# Patient Record
Sex: MALE | Race: WHITE | Employment: FULL TIME | URBAN - METROPOLITAN AREA
[De-identification: names, ages, dates, MRNs, and addresses within clinical notes are randomized per-mention and may not be internally consistent; named-entity substitution may affect disease eponyms.]

---

## 2018-05-31 ENCOUNTER — APPOINTMENT (OUTPATIENT)
Dept: PHYSICAL THERAPY | Age: 43
End: 2018-05-31

## 2018-06-04 ENCOUNTER — HOSPITAL ENCOUNTER (OUTPATIENT)
Dept: PHYSICAL THERAPY | Age: 43
Discharge: HOME | End: 2018-06-04
Payer: MEDICAID

## 2018-06-04 PROCEDURE — 97110 THERAPEUTIC EXERCISES: CPT

## 2018-06-04 PROCEDURE — 97161 PT EVAL LOW COMPLEX 20 MIN: CPT

## 2018-06-04 NOTE — PROGRESS NOTES
[facility]. [facility], [address] - Phone: [phone]  Fax: [phone] / [address]  Patient Name: Buzz Zarate : 1975   Medical   Diagnosis: Elbow pain, left [M25.522] Treatment Diagnosis: Elbow pain, left [M25.522]   Onset Date: 2 years     Referral Source: [place] Wilks* Start of Care [facility]): 2018   Prior Hospitalization: See medical history Provider #: [id number]   Prior Level of Function: FWB L UE without difficulty   Comorbidities: OA   Medications: Verified on Patient Summary List   The Plan of Care and following information is based on the information from the initial evaluation.   ===========================================================================================  Assessment / key information:  Pt is a 37 yo male who presents with (+) X-ray L elbow corticaled osseous fragment adjacent to medial epicondyle, mild OA, and bone spurs. ISIDRO/HPI: Injury 20+ years ago, \"possible dislocation\", wherein Pt has never been able to fully extend L elbow. 2 years ago insidious onset of L elbow swelling, which comes and goes every 2 weeks. Pt reports location of pain at lateral L elbow, nature of pain pressure/achy, current pain level 0/10, pain level at worst 4/10, and pain level at best 0/10. Aggravating factors: laying down on L elbow, push-ups, bench-press, end-range elbow flexion (grooming/uper body dressing), and sustained end-range elbow extension. Relieving factors: advil. PMHx/Falls: R knee scope, L knee scope '. CLoF: weight lifting 5x/week and cardio 2x/week. Objective: R/L elbow flexion-extension AROM: 0-138/- deg. R/L Shoulder ER AROM: C6/C4. L elbow flexion, extension, pronation, supination MMT: 5/5 for all. L shoulder ER MMT: 4/5.  Pt would benefit from skilled PT intervention in order to improve upon previously mentioned subjective/objective impairments. ===========================================================================================  Eval Complexity: History MEDIUM  Complexity : 1-2 comorbidities / personal factors will impact the outcome/ POC ;  Examination  LOW Complexity : 1-2 Standardized tests and measures addressing body structure, function, activity limitation and / or participation in recreation ; Presentation LOW Complexity : Stable, uncomplicated ;  Decision Making MEDIUM Complexity : FOTO score of 26-74; Overall Complexity LOW   Problem List: pain affecting function, decrease ROM, edema affecting function, decrease ADL/ functional abilitiies, decrease activity tolerance and decrease flexibility/ joint mobility   Treatment Plan may include any combination of the following: Therapeutic exercise, Therapeutic activities, Neuromuscular re-education, Physical agent/modality, Manual therapy, Patient education, Self Care training and Functional mobility training  Patient / Family readiness to learn indicated by: asking questions, trying to perform skills and interest  Persons(s) to be included in education: patient (P)  Barriers to Learning/Limitations: None  Measures taken:    Patient Goal (s): \"determine if surgery is necessary\"   Patient self reported health status: good  Rehabilitation Potential: good   Short Term Goals: To be accomplished in  2-3  weeks:  1. Establish initial HEP and pt compliant with instructions  2. Pt will report </=3/10 pain at worst to demonstrate increased tolerance laying on L elbow   Long Term Goals: To be accomplished in  5-6  weeks:  1. Increase FOTO score to 71 indicating improved function  2. Pt will report </=2/10 pain at worst to demonstrate increased tolerance performing  L UE FWB ADL's  3. Pt will increase L elbow extension AROM: </=-5 deg to perform push-up without difficulty  4.  Pt will increase L shoulder ER MMT: >/=4+/5 to perform bench-press without difficulty  Frequency / Duration:   Patient to be seen  2  times per week for 6  weeks:  Patient / Caregiver education and instruction: self care, activity modification and exercises  Therapist Signature: Milton Gonzalez PT Date: 4/0/6250   Certification Period: N/A Time: 4:56 PM   ===========================================================================================  I certify that the above Physical Therapy Services are being furnished while the patient is under my care. I agree with the treatment plan and certify that this therapy is necessary. Physician Signature:        Date:       Time:     Please sign and return to [facility] or you may fax the signed copy to [phone]. Thank you.

## 2018-06-04 NOTE — PROGRESS NOTES
PHYSICAL THERAPY - DAILY TREATMENT NOTE      Patient Name: Amanda Saucedo        Date: 2018  : 1975   YES Patient  Verified  Visit #:     Insurance: Payor: BLUE CROSS MEDICAID / Plan: [place] [employer] [place] / Product Type: Managed Care Medicaid /      In time: 4:00 Out time: 4:45   Total Treatment Time: 45     TREATMENT AREA = Elbow pain, left [M25.522]    SUBJECTIVE    Pain Level (on 0 to 10 scale):  0  / 10   Medication Changes/New allergies or changes in medical history, any new surgeries or procedures? NO    If yes, update Summary List   Subjective Functional Status/Changes:  []  No changes reported     See POC         Physical Therapy Initial Evaluation: Elbow Objective    AROM R L   Flexion   138 142   Extension 0 -10   Pronation WFL WFL   Supination WFL WFL     MMT R L   Flexion 5/5   4/4   Extension 5/5 5/5   Supination 5/5 5/5   Pronation 5/5 5/5     Wrist MMT R L   Flexion   5/5 5/5   Extension 5/5 5/5   UD 5/5 5/5   RD 5/5 5/5     Palpation:   [] Min  [x] Mod  [] Severe    Location: L lateral tricep  [] Min  [x] Mod  [] Severe    Location: L middle tricep    Other:   R/L Shoulder ER AROM: C6/C4  R/L Shoulder ER MMT: 5/5; 4/5    10 min Therapeutic Exercise:  [x]  See flow sheet   Rationale:      increase ROM to improve the patients ability to perform all UE ADL's     Throughout Tx min Patient Education:  YES  Reviewed HEP   []  Progressed/Changed HEP based on:         Other Objective/Functional Measures:    See above     Post Treatment Pain Level (on 0 to 10) scale:   0  / 10     ASSESSMENT    Assessment/Changes in Function:     See POC     []  See Progress Note/Recertification   Patient will continue to benefit from skilled PT services to modify and progress therapeutic interventions, address functional mobility deficits, address ROM deficits, address strength deficits, analyze and address soft tissue restrictions, analyze and cue movement patterns, analyze and modify body mechanics/ergonomics and assess and modify postural abnormalities to attain remaining goals.    Progress toward goals / Updated goals:    See POC     PLAN    [x]  Upgrade activities as tolerated YES Continue plan of care   []  Discharge due to :    []  Other:      Therapist: Chintan Smith PT    Date: 6/4/2018 Time: 4:57 PM   Future Appointments  Date Time Provider Katie Byrne   6/7/2018 8:30 AM Loreatha Nageotte, PT [facility]HAB [facility] AT [facility]   6/12/2018 7:00 AM Chintan Smith PT [facility] AT [facility]   6/14/2018 7:00 AM Chintan Smith PT [facility] AT [facility]   6/19/2018 7:00 AM Chintan Smith, PT [facility] AT [facility]   6/21/2018 8:00 AM Loreatha Nageotte, PT [facility] AT [facility]

## 2018-06-07 ENCOUNTER — HOSPITAL ENCOUNTER (OUTPATIENT)
Dept: PHYSICAL THERAPY | Age: 43
Discharge: HOME | End: 2018-06-07
Payer: MEDICAID

## 2018-06-07 PROCEDURE — 97110 THERAPEUTIC EXERCISES: CPT

## 2018-06-07 PROCEDURE — 97140 MANUAL THERAPY 1/> REGIONS: CPT

## 2018-06-07 NOTE — PROGRESS NOTES
PHYSICAL THERAPY - DAILY TREATMENT NOTE    Patient Name: Dayan Sanchez        Date: 2018  : 1975    Patient  Verified: YES  Visit #:   2   of   12  Insurance: Payor: BLUE CROSS MEDICAID / Plan: [facility] [employer] / Product Type: Managed Care Medicaid /      In time: 8:30 Out time: 9:25   Total Treatment Time: 55     Medicare Time Tracking (below)   Total Timed Codes (min):  45 1:1 Treatment Time:  30     TREATMENT AREA/ DIAGNOSIS = Elbow pain, left [M25.522]    SUBJECTIVE  Pain Level (on 0 to 10 scale):  0  / 10   Medication Changes/New allergies or changes in medical history, any new surgeries or procedures?     NO    If yes, update Summary List   Subjective Functional Status/Changes:  []  No changes reported     Functional improvement no pain   Functional limitation it sells up every other Wednesday      OBJECTIVE  Modalities Rationale:     decrease edema, decrease inflammation and decrease pain to improve patient's ability to perform ADLs without pain   min [] Estim, type/location:                                      []  att     []  unatt     []  w/US     []  w/ice    []  w/heat    min []  Mechanical Traction: type/lbs                   []  pro   []  sup   []  int   []  cont    []  before manual    []  after manual    min []  Ultrasound, settings/location:      min []  Iontophoresis w/ dexamethasone, location:                                               []  take home patch       []  in clinic   10 min [x]  Ice     []  Heat    location/position:     min []  Vasopneumatic Device, press/temp:     min []  Other:    [x] Skin assessment post-treatment (if applicable):    [x]  intact    [x]  redness- no adverse reaction     [x]redness - adverse reaction:        15 min Manual Therapy: Passive L elbow extension, IASTM/DTM to elbow   Rationale:      decrease pain, increase ROM and increase tissue extensibility to improve patient's ability to perform ADLs without pain    30 min Therapeutic Exercise:  [x]  See flow sheet   Rationale:      increase ROM and increase strength to improve the patients ability to perform ADLs without pain     min Patient Education:  Yes    [x] Reviewed HEP   []  Progressed/Changed HEP based on: Other Objective/Functional Measures:    Pt with use of L arm, pushing more than pulling  Initiated strengthening of UE  Tender just posterior to wrist extensor origin   Post Treatment Pain Level (on 0 to 10) scale:   0  / 10     ASSESSMENT  Assessment/Changes in Function:     No pain or swelling today, swells every 2 weeks      []  See Progress Note/Recertification   Patient will continue to benefit from skilled PT services to modify and progress therapeutic interventions, address functional mobility deficits, address ROM deficits, address strength deficits, analyze and address soft tissue restrictions, analyze and cue movement patterns, analyze and modify body mechanics/ergonomics and assess and modify postural abnormalities to attain remaining goals.    Progress toward goals / Updated goals:    NA     PLAN  [x]  Upgrade activities as tolerated YES Continue plan of care   []  Discharge due to :    []  Other:      Therapist: Newton Pickering PT    Date: 6/7/2018 Time: 11:58 AM        Future Appointments  Date Time Provider Katie Byrne   6/12/2018 7:00 AM Thad Alfredo, PT [facility] AT [facility]   6/14/2018 7:00 AM Thad Alfredo, PT [facility] AT [facility]   6/19/2018 7:00 AM Thad Alfredo PT [facility] AT [facility]   6/21/2018 8:00 AM Newton Pickering, PT RE[facility]ENTER AT [facility]

## 2018-06-12 ENCOUNTER — HOSPITAL ENCOUNTER (OUTPATIENT)
Dept: PHYSICAL THERAPY | Age: 43
Discharge: HOME | End: 2018-06-12
Payer: MEDICAID

## 2018-06-12 PROCEDURE — 97110 THERAPEUTIC EXERCISES: CPT

## 2018-06-12 PROCEDURE — 97140 MANUAL THERAPY 1/> REGIONS: CPT

## 2018-06-12 NOTE — PROGRESS NOTES
PHYSICAL THERAPY - DAILY TREATMENT NOTE      Patient Name: Vicente Martin        Date: 2018  : 1975   YES Patient  Verified  Visit #:   3   of   12  Insurance: Payor: BLUE CROSS MEDICAID / Plan: [facility] [employer] HE[employer]KEEPERS PLUS / Product Type: Managed Care Medicaid /      In time: 7:00 Out time: 7:55   Total Treatment Time: 45       TREATMENT AREA = Elbow pain, left [M25.522]    SUBJECTIVE    Pain Level (on 0 to 10 scale):  0  / 10   Medication Changes/New allergies or changes in medical history, any new surgeries or procedures? NO    If yes, update Summary List   Subjective Functional Status/Changes:  []  No changes reported     It's swollen a day earlier than normal       OBJECTIVE    35 min Therapeutic Exercise:  [x]  See flow sheet   Rationale:      increase ROM and increase strength to improve the patients ability to perform all UE ADL's       10 min Manual Therapy:  L lateral tricep IASTM.  Elbow ext stretch   Rationale:      increase ROM and increase tissue extensibility to improve patient's ability to perform all UE ADL's      Modalities Rationale:     decrease pain to improve patient's ability to perform all UE ADL's      min [] Estim, type/location:                                      []  att     []  unatt     []  w/US     []  w/ice    []  w/heat    min []  Mechanical Traction: type/lbs                   []  pro   []  sup   []  int   []  cont    []  before manual    []  after manual    min []  Ultrasound, settings/location:      min []  Iontophoresis w/ dexamethasone, location:                                               []  take home patch       []  in clinic   10 min [x]  Ice     []  Heat    location/position: L elbow/supine    min []  Vasopneumatic Device, press/temp:     min []  Other:    [x] Skin assessment post-treatment (if applicable):    [x]  intact    [x]  redness- no adverse reaction     []redness - adverse reaction:      Throughout Tx min Patient Education:  Melvin Padilal Reviewed HEP   []  Progressed/Changed HEP based on: Other Objective/Functional Measures:    Increased posterior elbow pain with elbow extension stretch     Post Treatment Pain Level (on 0 to 10) scale:   0  / 10     ASSESSMENT    Assessment/Changes in Function:     No increased pain during therex     []  See Progress Note/Recertification   Patient will continue to benefit from skilled PT services to modify and progress therapeutic interventions, address functional mobility deficits, address ROM deficits, address strength deficits, analyze and address soft tissue restrictions, analyze and cue movement patterns, analyze and modify body mechanics/ergonomics and assess and modify postural abnormalities to attain remaining goals.    Progress toward goals / Updated goals:    Continue manual to meet ROM goals     PLAN    [x]  Upgrade activities as tolerated YES Continue plan of care   []  Discharge due to :    []  Other:      Therapist: Renaldo Taylor PT    Date: 6/12/2018 Time: 7:24 AM   Future Appointments  Date Time Provider Katie Byrne   6/14/2018 7:00 AM Renaldo Taylor PT [facility] AT [facility]   6/19/2018 7:00 AM Renaldo Taylor PT [facility] AT [facility]   6/21/2018 8:00 AM Taz Villalobos PT RE[facility]AB CENTER AT [facility]

## 2018-06-14 ENCOUNTER — HOSPITAL ENCOUNTER (OUTPATIENT)
Dept: PHYSICAL THERAPY | Age: 43
Discharge: HOME | End: 2018-06-14
Payer: MEDICAID

## 2018-06-14 PROCEDURE — 97110 THERAPEUTIC EXERCISES: CPT

## 2018-06-14 PROCEDURE — 97140 MANUAL THERAPY 1/> REGIONS: CPT

## 2018-06-14 NOTE — PROGRESS NOTES
PHYSICAL THERAPY - DAILY TREATMENT NOTE      Patient Name: Shannan Ra        Date: 2018  : 1975   YES Patient  Verified  Visit #:   4   of   12  Insurance: Payor: BLUE CROSS MEDICAID / Plan: [facility] [employer] HEALTHKEEPERS PLUS / Product Type: Managed Care Medicaid /      In time: 7:00 Out time: 7:50   Total Treatment Time: 40       TREATMENT AREA = Elbow pain, left [M25.522]    SUBJECTIVE    Pain Level (on 0 to 10 scale):  0  / 10   Medication Changes/New allergies or changes in medical history, any new surgeries or procedures? NO    If yes, update Summary List   Subjective Functional Status/Changes:  []  No changes reported     Yesterday was bad in terms of swelling today is better, tomorrow it should be gone       OBJECTIVE    30 min Therapeutic Exercise:  [x]  See flow sheet   Rationale:      increase ROM and increase strength to improve the patients ability to perform all UE ADL's       10 min Manual Therapy:  L tricep IASTM.  Elbow ext stretch   Rationale:      increase ROM and increase tissue extensibility to improve patient's ability to perform all UE ADL's    Modalities Rationale:     decrease edema, decrease inflammation and decrease pain to improve patient's ability to perform all UE ADL's      min [] Estim, type/location:                                      []  att     []  unatt     []  w/US     []  w/ice    []  w/heat    min []  Mechanical Traction: type/lbs                   []  pro   []  sup   []  int   []  cont    []  before manual    []  after manual    min []  Ultrasound, settings/location:      min []  Iontophoresis w/ dexamethasone, location:                                               []  take home patch       []  in clinic   10 min [x]  Ice     []  Heat    location/position: L elbow/supine    min []  Vasopneumatic Device, press/temp:     min []  Other:    [x] Skin assessment post-treatment (if applicable):    [x]  intact    [x]  redness- no adverse reaction []redness - adverse reaction:      Throughout Tx min Patient Education:  YES  Reviewed HEP   []  Progressed/Changed HEP based on: Other Objective/Functional Measures:    Increased weight of s/l ER     Post Treatment Pain Level (on 0 to 10) scale:   0  / 10     ASSESSMENT    Assessment/Changes in Function:     No increased pain with progression of therex     []  See Progress Note/Recertification   Patient will continue to benefit from skilled PT services to modify and progress therapeutic interventions, address functional mobility deficits, address ROM deficits, address strength deficits, analyze and address soft tissue restrictions, analyze and cue movement patterns, analyze and modify body mechanics/ergonomics and assess and modify postural abnormalities to attain remaining goals.    Progress toward goals / Updated goals:    Continue manual to meet all ROM goals     PLAN    [x]  Upgrade activities as tolerated YES Continue plan of care   []  Discharge due to :    []  Other:      Therapist: Leland Smith PT    Date: 6/14/2018 Time: 7:03 AM   Future Appointments  Date Time Provider Katie Byrne   6/19/2018 7:00 AM Leland Smith PT [facility] AT [facility]   6/21/2018 8:00 AM Leland Smith PT RE[facility] AT [facility]

## 2018-06-19 ENCOUNTER — HOSPITAL ENCOUNTER (OUTPATIENT)
Dept: PHYSICAL THERAPY | Age: 43
Discharge: HOME | End: 2018-06-19
Payer: MEDICAID

## 2018-06-19 PROCEDURE — 97110 THERAPEUTIC EXERCISES: CPT

## 2018-06-19 PROCEDURE — 97140 MANUAL THERAPY 1/> REGIONS: CPT

## 2018-06-19 NOTE — PROGRESS NOTES
PHYSICAL THERAPY - DAILY TREATMENT NOTE      Patient Name: Alex Esposito        Date: 2018  : 1975   YES Patient  Verified  Visit #:      of   12  Insurance: Payor: BLUE CROSS MEDICAID / Plan: [facility] HEALTHKEEPERS PLUS / Product Type: Managed Care Medicaid /      In time: 7:00 Out time: 7:54   Total Treatment Time: 44       TREATMENT AREA = Elbow pain, left [M25.522]    SUBJECTIVE    Pain Level (on 0 to 10 scale):  0  / 10   Medication Changes/New allergies or changes in medical history, any new surgeries or procedures? NO    If yes, update Summary List   Subjective Functional Status/Changes:  []  No changes reported     Elbow stopped swelling on Friday. There has been some clicking but no pain       OBJECTIVE    34 min Therapeutic Exercise:  [x]  See flow sheet   Rationale:      increase ROM and increase strength to improve the patients ability to perform all UE ADL's       10 min Manual Therapy:  L tricep IASTM.  Elbow ext stretch   Rationale:      increase ROM and increase tissue extensibility to improve patient's ability to perform all UE ADL's    Modalities Rationale:     decrease edema, decrease inflammation and decrease pain to improve patient's ability to perform all UE ADL's      min [] Estim, type/location:                                      []  att     []  unatt     []  w/US     []  w/ice    []  w/heat    min []  Mechanical Traction: type/lbs                   []  pro   []  sup   []  int   []  cont    []  before manual    []  after manual    min []  Ultrasound, settings/location:      min []  Iontophoresis w/ dexamethasone, location:                                               []  take home patch       []  in clinic   10 min [x]  Ice     []  Heat    location/position: L elbow/supine    min []  Vasopneumatic Device, press/temp:     min []  Other:    [x] Skin assessment post-treatment (if applicable):    [x]  intact    [x]  redness- no adverse reaction     []redness - adverse reaction:      Throughout Tx min Patient Education:  YES  Reviewed HEP   []  Progressed/Changed HEP based on: Other Objective/Functional Measures:    Increased resisted shoulder ER to blue resistance level     Post Treatment Pain Level (on 0 to 10) scale:   0  / 10     ASSESSMENT    Assessment/Changes in Function:     No increased pain with progression of therex     []  See Progress Note/Recertification   Patient will continue to benefit from skilled PT services to modify and progress therapeutic interventions, address functional mobility deficits, address ROM deficits, address strength deficits, analyze and address soft tissue restrictions, analyze and cue movement patterns, analyze and modify body mechanics/ergonomics and assess and modify postural abnormalities to attain remaining goals.    Progress toward goals / Updated goals:    Continue manual to meet all ROM goals     PLAN    [x]  Upgrade activities as tolerated YES Continue plan of care   []  Discharge due to :    []  Other:      Therapist: Alaina Sellers PT    Date: 6/19/2018 Time: 7:21 AM   Future Appointments  Date Time Provider Katie Byrne   6/21/2018 8:00 AM Alaina Sellers PT [facility] AT [facility]

## 2018-06-21 ENCOUNTER — HOSPITAL ENCOUNTER (OUTPATIENT)
Dept: PHYSICAL THERAPY | Age: 43
Discharge: HOME | End: 2018-06-21
Payer: MEDICAID

## 2018-06-21 PROCEDURE — 97110 THERAPEUTIC EXERCISES: CPT

## 2018-06-21 PROCEDURE — 97140 MANUAL THERAPY 1/> REGIONS: CPT

## 2018-06-21 NOTE — PROGRESS NOTES
PHYSICAL THERAPY - DAILY TREATMENT NOTE      Patient Name: Filiberto Barnes        Date: 2018  : 1975   YES Patient  Verified  Visit #:      12  Insurance: Payor: BLUE CROSS MEDICAID / Plan: [facility] HEALTHKEEPERS PLUS / Product Type: Managed Care Medicaid /      In time: 8:00 Out time: 8:52   Total Treatment Time: 42       TREATMENT AREA = Elbow pain, left [M25.522]    SUBJECTIVE    Pain Level (on 0 to 10 scale):  0  / 10   Medication Changes/New allergies or changes in medical history, any new surgeries or procedures? NO    If yes, update Summary List   Subjective Functional Status/Changes:  []  No changes reported     No swelling today       OBJECTIVE    32 min Therapeutic Exercise:  [x]  See flow sheet   Rationale:      increase ROM and increase strength to improve the patients ability to perform all UE ADL's       10 min Manual Therapy:  L tricep IASTM. Elbow ext stretch   Rationale:      increase ROM to improve patient's ability to perform all UE ADL's      Modalities Rationale:     decrease pain to improve patient's ability to perform all UE ADL's      min [] Estim, type/location:                                      []  att     []  unatt     []  w/US     []  w/ice    []  w/heat    min []  Mechanical Traction: type/lbs                   []  pro   []  sup   []  int   []  cont    []  before manual    []  after manual    min []  Ultrasound, settings/location:      min []  Iontophoresis w/ dexamethasone, location:                                               []  take home patch       []  in clinic   10 min [x]  Ice     []  Heat    location/position: Le elbow/supine    min []  Vasopneumatic Device, press/temp:     min []  Other:    [x] Skin assessment post-treatment (if applicable):    [x]  intact    [x]  redness- no adverse reaction     []redness - adverse reaction:      Throughout Tx min Patient Education:  YES  Reviewed HEP   []  Progressed/Changed HEP based on:         Other Objective/Functional Measures:    L elbow ext AROM: -5 deg     Post Treatment Pain Level (on 0 to 10) scale:   0  / 10     ASSESSMENT    Assessment/Changes in Function:     No increased pain during therex     []  See Progress Note/Recertification   Patient will continue to benefit from skilled PT services to modify and progress therapeutic interventions, address functional mobility deficits, address ROM deficits, address strength deficits, analyze and address soft tissue restrictions, analyze and cue movement patterns, analyze and modify body mechanics/ergonomics and assess and modify postural abnormalities to attain remaining goals. Progress toward goals / Updated goals:    Continue manual to meet all ROM goals     PLAN    [x]  Upgrade activities as tolerated YES Continue plan of care   []  Discharge due to :    []  Other:      Therapist: Moses Gill PT    Date: 6/21/2018 Time: 8:07 AM   No future appointments.

## 2018-07-03 ENCOUNTER — HOSPITAL ENCOUNTER (OUTPATIENT)
Dept: PHYSICAL THERAPY | Age: 43
Discharge: HOME | End: 2018-07-03
Payer: MEDICAID

## 2018-07-03 PROCEDURE — 97140 MANUAL THERAPY 1/> REGIONS: CPT

## 2018-07-03 PROCEDURE — 97110 THERAPEUTIC EXERCISES: CPT

## 2018-07-03 NOTE — PROGRESS NOTES
PHYSICAL THERAPY - DAILY TREATMENT NOTE      Patient Name: Herve Sidhu        Date: 7/3/2018  : 1975   YES Patient  Verified  Visit #:     Insurance: Payor: [employer] / Plan: [facility] [employer] PLUS / Product Type: Managed Care Medicaid /      In time: 7:00 Out time: 7:50   Total Treatment Time: 50       TREATMENT AREA = Elbow pain, left [M25.522]    SUBJECTIVE    Pain Level (on 0 to 10 scale):  0  / 10   Medication Changes/New allergies or changes in medical history, any new surgeries or procedures? NO    If yes, update Summary List   Subjective Functional Status/Changes:  []  No changes reported     See d/c summary       OBJECTIVE    35 min Therapeutic Exercise:  [x]  See flow sheet   Rationale:      increase ROM and increase strength to improve the patients ability to perform all UE ADL's       15 min Manual Therapy:  L tricep ISATM. Elbow ext stretch   Rationale:      increase ROM to improve patient's ability to perform all UE ADL's    Throughout Tx min Patient Education:  YES  Reviewed HEP   []  Progressed/Changed HEP based on: Other Objective/Functional Measures:    See d/c summary     Post Treatment Pain Level (on 0 to 10) scale:   0  / 10     ASSESSMENT    Assessment/Changes in Function:     See d/c summary     []  See Progress Note/Recertification   Patient will continue to benefit from skilled PT services to modify and progress therapeutic interventions, address functional mobility deficits, address ROM deficits, address strength deficits, analyze and address soft tissue restrictions, analyze and cue movement patterns, analyze and modify body mechanics/ergonomics and assess and modify postural abnormalities to attain remaining goals.    Progress toward goals / Updated goals:    See d/c summary     PLAN    []  Upgrade activities as tolerated YES Continue plan of care   [x]  Discharge due to : See d/c summary   []  Other:      Therapist: Germaine Rodriguez Laurie Cantu, PT    Date: 7/3/2018 Time: 8:12 AM   No future appointments.

## 2018-07-03 NOTE — PROGRESS NOTES
[facility] PHYSICAL THERAPY AT [facility] [address], [address]  Phone: [phone]  Fax: [phone] SUMMARY  Patient Name: Riki Kendrick : 1975   Treatment/Medical Diagnosis: Elbow pain, left [M25.522]   Referral Source: Omishawna Bradly*     Date of Initial Visit: 18 Attended Visits: 7 Missed Visits: 0       SUMMARY OF TREATMENT   Jonah Kim has been seen at our clinic 2x/wk for a total of 7 visits. Pt treatment has consisted of therapeutic exercise for Elbow A/PROM and strengthening in all planes, RTC mm strenthening, postural correction, and manual therapy. CURRENT STATUS   Pt has had a good response to physical therapy treatment. Pt reports 10% overall improvement since [facility]. Swelling has reduced to 1/2 day less than prior to [facility]. Continued pain/diffiuclty with bench press, push-up, and laying on L elbow. Pain at best: 0/10, pain at worst: 2/10. L Elbow AROM: Ext -5 deg. L shoulder ER MMT: 5/5. Previous Goals:  1. Increase FOTO score to 71 indicating improved function  2. Pt will report </=2/10 pain at worst to demonstrate increased tolerance performing  L UE FWB ADL's  3. Pt will increase L elbow extension AROM: </=-5 deg to perform push-up without difficulty  4. Pt will increase L shoulder ER MMT: >/=4+/5 to perform bench-press without difficulty   Prior Level/Current Level:  1) Prior Level: 61   Current Level: 69   Goal Met? no  2) Prior Level: 4/10   Current Level: 2/10   Goal Met? yes  3) Prior Level: R/L elbow flexion-extension AROM: 0-138/- deg   Current Level:  L Elbow AROM: Ext -5 deg   Goal Met? yes  4) Prior Level: L shoulder ER MMT: 4/5   Current Level:  L shoulder ER MMT: 5/5   Goal Met? yes    RECOMMENDATIONS  Discontinue therapy due to lack of appreciable progress towards goals.  Recommend referral to orthopedic specialist and further diagnostic imaging via MRI  If you have any questions/comments please contact us directly at [phone]. Thank you for allowing us to assist in the care of your patient.     Therapist Signature: Toshia Lopez PT Date: 7/3/18     Time: 7:08 AM

## 2018-07-05 ENCOUNTER — APPOINTMENT (OUTPATIENT)
Dept: PHYSICAL THERAPY | Age: 43
End: 2018-07-05
Payer: MEDICAID

## 2018-07-09 ENCOUNTER — APPOINTMENT (OUTPATIENT)
Dept: PHYSICAL THERAPY | Age: 43
End: 2018-07-09
Payer: MEDICAID

## 2018-07-12 ENCOUNTER — APPOINTMENT (OUTPATIENT)
Dept: PHYSICAL THERAPY | Age: 43
End: 2018-07-12
Payer: MEDICAID